# Patient Record
Sex: FEMALE | Race: WHITE | NOT HISPANIC OR LATINO | Employment: FULL TIME | URBAN - METROPOLITAN AREA
[De-identification: names, ages, dates, MRNs, and addresses within clinical notes are randomized per-mention and may not be internally consistent; named-entity substitution may affect disease eponyms.]

---

## 2023-03-22 ENCOUNTER — OFFICE VISIT (OUTPATIENT)
Dept: OBSTETRICS AND GYNECOLOGY | Facility: CLINIC | Age: 28
End: 2023-03-22
Payer: COMMERCIAL

## 2023-03-22 ENCOUNTER — PATIENT ROUNDING (BHMG ONLY) (OUTPATIENT)
Dept: OBSTETRICS AND GYNECOLOGY | Facility: CLINIC | Age: 28
End: 2023-03-22
Payer: COMMERCIAL

## 2023-03-22 VITALS
WEIGHT: 170 LBS | HEIGHT: 66 IN | BODY MASS INDEX: 27.32 KG/M2 | SYSTOLIC BLOOD PRESSURE: 110 MMHG | DIASTOLIC BLOOD PRESSURE: 68 MMHG

## 2023-03-22 DIAGNOSIS — Z30.011 ENCOUNTER FOR INITIAL PRESCRIPTION OF CONTRACEPTIVE PILLS: ICD-10-CM

## 2023-03-22 DIAGNOSIS — Z01.419 ENCOUNTER FOR GYNECOLOGICAL EXAMINATION WITHOUT ABNORMAL FINDING: Primary | ICD-10-CM

## 2023-03-22 PROCEDURE — 99385 PREV VISIT NEW AGE 18-39: CPT | Performed by: NURSE PRACTITIONER

## 2023-03-22 RX ORDER — NORGESTIMATE AND ETHINYL ESTRADIOL 7DAYSX3 28
1 KIT ORAL DAILY
COMMUNITY
Start: 2023-03-21 | End: 2023-03-22 | Stop reason: SDUPTHER

## 2023-03-22 RX ORDER — BUPROPION HYDROCHLORIDE 300 MG/1
1 TABLET ORAL DAILY
COMMUNITY
Start: 2023-03-09

## 2023-03-22 RX ORDER — BUPROPION HYDROCHLORIDE 150 MG/1
1 TABLET ORAL DAILY
COMMUNITY
Start: 2023-03-09

## 2023-03-22 RX ORDER — ARIPIPRAZOLE 5 MG/1
1 TABLET ORAL DAILY
COMMUNITY
Start: 2023-03-09

## 2023-03-22 RX ORDER — NORGESTIMATE AND ETHINYL ESTRADIOL 7DAYSX3 28
1 KIT ORAL DAILY
Qty: 84 TABLET | Refills: 3 | Status: SHIPPED | OUTPATIENT
Start: 2023-03-22

## 2023-03-22 NOTE — PROGRESS NOTES
A [employer] message has been sent to the patient for PATIENT ROUNDING with [facility] [place].

## 2023-03-22 NOTE — PROGRESS NOTES
"Chief Complaint  Izzy Dillon is a 27 y.o.  female presenting for Gynecologic Exam (New GYN, hospitals care.  Annual exam.)    History of Present Illness  Izzy is a very pleasant 28yo nulligravid young woman.  She is here for gyn care and a pap smear.  She is pleased to stay on the same COCP.  She has no ACHES or bleeding problems on this pill.  ROS negative.  Moods are good with current meds.      The following portions of the patient's history were reviewed and updated as appropriate: allergies, current medications, past family history, past medical history, past social history, past surgical history and problem list.    Allergies   Allergen Reactions   • Azithromycin Hives   • Cefprozil Hives         Current Outpatient Medications:   •  Tri-Sprintec 0.18/0.215/0.25 MG-35 MCG per tablet, Take 1 tablet by mouth Daily., Disp: 84 tablet, Rfl: 3  •  ARIPiprazole (ABILIFY) 5 MG tablet, Take 1 tablet by mouth Daily., Disp: , Rfl:   •  buPROPion XL (WELLBUTRIN XL) 150 MG 24 hr tablet, Take 1 tablet by mouth Daily., Disp: , Rfl:   •  buPROPion XL (WELLBUTRIN XL) 300 MG 24 hr tablet, Take 1 tablet by mouth Daily., Disp: , Rfl:     Past Medical History:   Diagnosis Date   • Anxiety    • Depression         Past Surgical History:   Procedure Laterality Date   • WISDOM TOOTH EXTRACTION         Objective  /68   Ht 167.6 cm (66\")   Wt 77.1 kg (170 lb)   LMP 2023 (Exact Date)   Breastfeeding No   BMI 27.44 kg/m²     Physical Exam  Vitals and nursing note reviewed. Exam conducted with a chaperone present.   Constitutional:       General: She is not in acute distress.     Appearance: Normal appearance. She is not ill-appearing.   HENT:      Head: Normocephalic.   Neck:      Thyroid: No thyroid mass or thyromegaly.   Cardiovascular:      Rate and Rhythm: Normal rate and regular rhythm.      Heart sounds: Normal heart sounds. No murmur heard.  Pulmonary:      Effort: Pulmonary effort is normal. No " respiratory distress.      Breath sounds: Normal breath sounds.   Chest:   Breasts:     Right: No inverted nipple, mass or nipple discharge.      Left: No inverted nipple, mass or nipple discharge.   Abdominal:      Palpations: Abdomen is soft. There is no mass.      Tenderness: There is no abdominal tenderness.   Genitourinary:     General: Normal vulva.      Labia:         Right: No rash, tenderness or lesion.         Left: No rash, tenderness or lesion.       Vagina: Normal. No vaginal discharge or erythema.      Cervix: No discharge, lesion or erythema.      Uterus: Not enlarged and not tender.       Adnexa:         Right: No mass or tenderness.          Left: No mass or tenderness.        Comments: Anus appears wnl.  No rectal exam performed.  Lymphadenopathy:      Upper Body:      Right upper body: No supraclavicular or axillary adenopathy.      Left upper body: No supraclavicular or axillary adenopathy.   Skin:     General: Skin is warm and dry.   Neurological:      Mental Status: She is alert and oriented to person, place, and time.   Psychiatric:         Mood and Affect: Mood normal.         Behavior: Behavior normal.         Assessment/Plan   Diagnoses and all orders for this visit:    1. Encounter for gynecological examination without abnormal finding (Primary)    2. Encounter for initial prescription of contraceptive pills  -     Tri-Sprintec 0.18/0.215/0.25 MG-35 MCG per tablet; Take 1 tablet by mouth Daily.  Dispense: 84 tablet; Refill: 3        Procedures    19 to 39: Counseling/Anticipatory Guidance Discussed: family planning/contraception and breast cancer and self breast exams    Return in about 1 year (around 3/22/2024) for Annual physical.    Hyun Goff, YUKI  03/22/2023

## 2023-03-23 LAB — REF LAB TEST METHOD: NORMAL

## 2023-11-29 ENCOUNTER — TELEPHONE (OUTPATIENT)
Dept: OBSTETRICS AND GYNECOLOGY | Facility: CLINIC | Age: 28
End: 2023-11-29
Payer: COMMERCIAL

## 2023-11-29 NOTE — TELEPHONE ENCOUNTER
Provider: YUKI Prince    Caller: ROBERT CREWS    Relationship to Patient: SELF    Pharmacy:     Phone Number: [phone]    Reason for Call: Decreased sex drive/libido    When was the patient last seen: 03.22.23    When did it start: ABOUT 2 YRS AGO    PATIENT CALLING IN BECAUSE SHE HAS AN Decreased sex drive/libido.  HUB DOES NOT HAVE THE AVAILABILITY. PATIENT WOULD LIKE TO BE SEEN SOONER THEN 03.27.24 I PUT THE DIAGNOSE ON HER ANNUAL BECAUSE THERE WHERE NO DATES.     PATIENT CAN BE REACHED .[phone]    THANK YOU

## 2023-11-29 NOTE — TELEPHONE ENCOUNTER
Called and spoke with patient.  She is not due for annual until March, however I offered to schedule patient with one of our other providers before this time to discuss decreased libido.  Patient opted to do this and has been scheduled with Yamini Jeff next month (December 2024, in a couple weeks).

## 2023-12-22 ENCOUNTER — OFFICE VISIT (OUTPATIENT)
Dept: OBSTETRICS AND GYNECOLOGY | Facility: CLINIC | Age: 28
End: 2023-12-22
Payer: COMMERCIAL

## 2023-12-22 VITALS
BODY MASS INDEX: 28.57 KG/M2 | SYSTOLIC BLOOD PRESSURE: 116 MMHG | RESPIRATION RATE: 16 BRPM | DIASTOLIC BLOOD PRESSURE: 70 MMHG | WEIGHT: 177 LBS

## 2023-12-22 DIAGNOSIS — F41.9 ANXIETY: Chronic | ICD-10-CM

## 2023-12-22 DIAGNOSIS — Z30.011 ENCOUNTER FOR INITIAL PRESCRIPTION OF CONTRACEPTIVE PILLS: ICD-10-CM

## 2023-12-22 DIAGNOSIS — F32.89 OTHER DEPRESSION: Chronic | ICD-10-CM

## 2023-12-22 DIAGNOSIS — Z30.09 ENCOUNTER FOR OTHER GENERAL COUNSELING OR ADVICE ON CONTRACEPTION: ICD-10-CM

## 2023-12-22 DIAGNOSIS — R68.82 DECREASED LIBIDO: Primary | ICD-10-CM

## 2023-12-22 PROBLEM — Z30.9 CONTRACEPTIVE MANAGEMENT: Status: ACTIVE | Noted: 2023-12-22

## 2023-12-22 PROBLEM — F32.A DEPRESSION: Chronic | Status: ACTIVE | Noted: 2023-12-22

## 2023-12-22 RX ORDER — LORATADINE 10 MG/1
10 TABLET ORAL DAILY
COMMUNITY

## 2023-12-22 RX ORDER — DROSPIRENONE AND ETHINYL ESTRADIOL 0.02-3(28)
1 KIT ORAL DAILY
Qty: 28 TABLET | Refills: 3 | Status: SHIPPED | OUTPATIENT
Start: 2023-12-22 | End: 2024-04-12
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
## 2024-03-15 ENCOUNTER — TELEPHONE (OUTPATIENT)
Dept: OBSTETRICS AND GYNECOLOGY | Facility: CLINIC | Age: 29
End: 2024-03-15

## 2024-03-15 NOTE — TELEPHONE ENCOUNTER
Caller: Izzy Dillon    Relationship to patient: Self    Best call back number: [phone]        Type of visit: GYN F/U       Additional notes:PT  CANCELLED TODAYS APPT SHE DID NOT WANT TO COME IN 2 TIMES IN 1 MONTH

## 2024-03-15 NOTE — TELEPHONE ENCOUNTER
Caller: Izzy Dillon    Relationship to patient: Self    Best call back number: [phone]    Patient is needing: PT MISSED CALL FROM OFFICE. NO VM. APPT TODAY AT 3:30PM    OKAY LVM

## 2024-04-18 DIAGNOSIS — Z30.011 ENCOUNTER FOR INITIAL PRESCRIPTION OF CONTRACEPTIVE PILLS: ICD-10-CM

## 2024-04-18 RX ORDER — DROSPIRENONE AND ETHINYL ESTRADIOL 0.02-3(28)
1 KIT ORAL DAILY
Qty: 28 TABLET | Refills: 1 | Status: SHIPPED | OUTPATIENT
Start: 2024-04-18

## 2024-06-10 ENCOUNTER — OFFICE VISIT (OUTPATIENT)
Dept: OBSTETRICS AND GYNECOLOGY | Facility: CLINIC | Age: 29
End: 2024-06-10
Payer: COMMERCIAL

## 2024-06-10 VITALS
SYSTOLIC BLOOD PRESSURE: 116 MMHG | DIASTOLIC BLOOD PRESSURE: 70 MMHG | BODY MASS INDEX: 28.57 KG/M2 | WEIGHT: 177 LBS | RESPIRATION RATE: 16 BRPM

## 2024-06-10 DIAGNOSIS — Z01.419 ENCOUNTER FOR GYNECOLOGICAL EXAMINATION WITHOUT ABNORMAL FINDING: Primary | ICD-10-CM

## 2024-06-10 DIAGNOSIS — Z30.011 ENCOUNTER FOR INITIAL PRESCRIPTION OF CONTRACEPTIVE PILLS: ICD-10-CM

## 2024-06-10 DIAGNOSIS — F52.0 HYPOACTIVE SEXUAL DESIRE DISORDER: ICD-10-CM

## 2024-06-10 PROCEDURE — 99395 PREV VISIT EST AGE 18-39: CPT

## 2024-06-10 PROCEDURE — 99213 OFFICE O/P EST LOW 20 MIN: CPT

## 2024-06-10 PROCEDURE — 99459 PELVIC EXAMINATION: CPT

## 2024-06-10 RX ORDER — DROSPIRENONE AND ETHINYL ESTRADIOL 0.02-3(28)
1 KIT ORAL DAILY
Qty: 28 TABLET | Refills: 2 | Status: SHIPPED | OUTPATIENT
Start: 2024-06-10
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
## 2024-06-19 DIAGNOSIS — Z30.011 ENCOUNTER FOR INITIAL PRESCRIPTION OF CONTRACEPTIVE PILLS: ICD-10-CM

## 2024-06-19 RX ORDER — DROSPIRENONE AND ETHINYL ESTRADIOL 0.02-3(28)
1 KIT ORAL DAILY
Qty: 28 TABLET | Refills: 2 | OUTPATIENT
Start: 2024-06-19

## 2024-08-05 ENCOUNTER — OFFICE VISIT (OUTPATIENT)
Dept: OBSTETRICS AND GYNECOLOGY | Facility: CLINIC | Age: 29
End: 2024-08-05
Payer: COMMERCIAL

## 2024-08-05 VITALS
BODY MASS INDEX: 28.08 KG/M2 | RESPIRATION RATE: 16 BRPM | DIASTOLIC BLOOD PRESSURE: 70 MMHG | SYSTOLIC BLOOD PRESSURE: 112 MMHG | WEIGHT: 174 LBS

## 2024-08-05 DIAGNOSIS — Z30.41 ENCOUNTER FOR SURVEILLANCE OF CONTRACEPTIVE PILLS: Primary | ICD-10-CM

## 2024-08-05 DIAGNOSIS — R68.82 DECREASED LIBIDO: ICD-10-CM

## 2024-08-05 PROCEDURE — 99213 OFFICE O/P EST LOW 20 MIN: CPT

## 2024-08-05 RX ORDER — DROSPIRENONE AND ETHINYL ESTRADIOL 0.02-3(28)
1 KIT ORAL DAILY
Qty: 28 TABLET | Refills: 12 | Status: SHIPPED | OUTPATIENT
Start: 2024-08-05

## 2024-08-05 NOTE — PROGRESS NOTES
Subjective   Chief Complaint   Patient presents with    Follow-up     MEDICATION     Izzy Dillon is a 28 y.o. year old .  Patient's last menstrual period was 07/15/2024.  She presents to be seen because of medication follow up. She was seen for decreased libido, and started on Addyi as well as changed CORINA to a lower estrogen pill. She reports absolutely no help from the Addyi. She additionally was told by her pharmacy that her new CORINA had no refills so she went back to her [facility], but would like to try the Kaila again.     OTHER THINGS SHE WANTS TO DISCUSS TODAY:  Of note, she is being evaluated for sleep apnea. Possible connection to her decreased libido/constant fatigue.     The following portions of the patient's history were reviewed and updated as appropriate:current medications, allergies, past family history, past medical history, and past social history    Social History    Tobacco Use      Smoking status: Not on file      Smokeless tobacco: Never      Review of Systems        Objective   /70   Resp 16   Wt 78.9 kg (174 lb)   LMP 07/15/2024   BMI 28.08 kg/m²     Physical Exam    Lab Review   No data reviewed    Imaging   No data reviewed        Assessment & Plan   Diagnoses and all orders for this visit:    1. Encounter for surveillance of contraceptive pills (Primary)    2. Decreased libido    Other orders  -     drospirenone-ethinyl estradiol (Daphney) 3-0.02 MG per tablet; Take 1 tablet by mouth Daily.  Dispense: 28 tablet; Refill: 12        The importance of keeping all planned follow-up and taking all medications as prescribed was emphasized.    Return in about 10 months (around 2025) for Annual physical.    New Medications Ordered This Visit   Medications    drospirenone-ethinyl estradiol (Daphney) 3-0.02 MG per tablet     Sig: Take 1 tablet by mouth Daily.     Dispense:  28 tablet     Refill:  12                 This note was electronically signed.    Yamini Jeff,  APRN  August 5, 2024

## 2025-06-11 ENCOUNTER — OFFICE VISIT (OUTPATIENT)
Dept: OBSTETRICS AND GYNECOLOGY | Facility: CLINIC | Age: 30
End: 2025-06-11
Payer: COMMERCIAL

## 2025-06-11 VITALS
DIASTOLIC BLOOD PRESSURE: 70 MMHG | SYSTOLIC BLOOD PRESSURE: 112 MMHG | WEIGHT: 195 LBS | BODY MASS INDEX: 31.47 KG/M2 | RESPIRATION RATE: 16 BRPM

## 2025-06-11 DIAGNOSIS — Z31.69 ENCOUNTER FOR PRECONCEPTION CONSULTATION: Primary | ICD-10-CM

## 2025-06-11 RX ORDER — VORTIOXETINE 10 MG/1
10 TABLET, FILM COATED ORAL
COMMUNITY
Start: 2025-01-06

## 2025-06-11 NOTE — PROGRESS NOTES
Subjective   Chief Complaint   Patient presents with    Follow-up     Desires pregnancy and wants to discuss medication     Izzy Dillon is a 29 y.o. year old .  Patient's last menstrual period was 2025.  She presents to be seen because of wanting to discuss medications in preparation for potential pregnancy. She was seen by her therapist/prescribing provider regarding Abilify, Wellbutrin, and Trintellix, who told her to defer to ob/gyn. She is thinking potentially this fall to start trying to get pregnant.     History of Present Illness         OTHER THINGS SHE WANTS TO DISCUSS TODAY:  Nothing else    The following portions of the patient's history were reviewed and updated as appropriate:current medications, allergies, past medical history, and past social history    Social History    Tobacco Use      Smoking status: Not on file      Smokeless tobacco: Never      Review of Systems        Objective   /70   Resp 16   Wt 88.5 kg (195 lb)   LMP 2025   BMI 31.47 kg/m²     Physical Exam  Vitals and nursing note reviewed.   Psychiatric:         Mood and Affect: Mood normal.         Behavior: Behavior normal.         Thought Content: Thought content normal.         Judgment: Judgment normal.         Physical Exam         Lab Review   No data reviewed    Imaging   No data reviewed     Results            Assessment & Plan   Diagnoses and all orders for this visit:    1. Encounter for preconception consultation (Primary)    Reviewed risks and benefits of each medication utilizing [employer]. Recommend she work towards tapering down what she feels like she can with the help of her prescribing provider, however stressed that her mental health takes precedence and we will work with her wherever she ends up. She has an annual visit scheduled later this summer so we will evaluate at that time what she has been able to accomplish.     Recommended she  a prenatal vitamin and start that in the  meantime. She voiced understanding and appreciation.     The importance of keeping all planned follow-up and taking all medications as prescribed was emphasized.    Return for Next scheduled follow up.    No orders of the defined types were placed in this encounter.                Assessment & Plan         This note was electronically signed.    Yamini Jeff, YUKI  June 11, 2025